# Patient Record
Sex: MALE | Race: WHITE | NOT HISPANIC OR LATINO
[De-identification: names, ages, dates, MRNs, and addresses within clinical notes are randomized per-mention and may not be internally consistent; named-entity substitution may affect disease eponyms.]

---

## 2021-04-16 ENCOUNTER — APPOINTMENT (OUTPATIENT)
Age: 56
End: 2021-04-16
Payer: COMMERCIAL

## 2021-04-16 PROCEDURE — 0001A: CPT

## 2021-05-07 ENCOUNTER — APPOINTMENT (OUTPATIENT)
Age: 56
End: 2021-05-07
Payer: COMMERCIAL

## 2021-05-07 PROCEDURE — 0002A: CPT

## 2023-03-17 ENCOUNTER — APPOINTMENT (OUTPATIENT)
Dept: CARDIOLOGY | Facility: CLINIC | Age: 58
End: 2023-03-17
Payer: COMMERCIAL

## 2023-03-17 ENCOUNTER — TRANSCRIPTION ENCOUNTER (OUTPATIENT)
Age: 58
End: 2023-03-17

## 2023-03-17 VITALS
SYSTOLIC BLOOD PRESSURE: 126 MMHG | HEIGHT: 67 IN | HEART RATE: 79 BPM | WEIGHT: 170 LBS | DIASTOLIC BLOOD PRESSURE: 70 MMHG | OXYGEN SATURATION: 98 % | BODY MASS INDEX: 26.68 KG/M2

## 2023-03-17 DIAGNOSIS — Z82.3 FAMILY HISTORY OF STROKE: ICD-10-CM

## 2023-03-17 DIAGNOSIS — Z82.49 FAMILY HISTORY OF ISCHEMIC HEART DISEASE AND OTHER DISEASES OF THE CIRCULATORY SYSTEM: ICD-10-CM

## 2023-03-17 DIAGNOSIS — F12.90 CANNABIS USE, UNSPECIFIED, UNCOMPLICATED: ICD-10-CM

## 2023-03-17 DIAGNOSIS — Z78.9 OTHER SPECIFIED HEALTH STATUS: ICD-10-CM

## 2023-03-17 DIAGNOSIS — K21.9 GASTRO-ESOPHAGEAL REFLUX DISEASE W/OUT ESOPHAGITIS: ICD-10-CM

## 2023-03-17 PROBLEM — Z00.00 ENCOUNTER FOR PREVENTIVE HEALTH EXAMINATION: Status: ACTIVE | Noted: 2023-03-17

## 2023-03-17 PROCEDURE — 99204 OFFICE O/P NEW MOD 45 MIN: CPT

## 2023-03-17 NOTE — PHYSICAL EXAM
[No Murmur] : no murmur [Normal] : moves all extremities, no focal deficits, normal speech [de-identified] : No carotid bruits auscultated bilaterally [de-identified] : extrasystolic beats auscultated

## 2023-03-17 NOTE — HISTORY OF PRESENT ILLNESS
[FreeTextEntry1] : 57 year old male with PMhx of HLD, PVCs, MVP (states he was diagnosed at least 10 years ago) presents for a cardiac evaluation prior to cholecystectomy on 3/20/2023 at [facility]. HIs preoperative ECG on 3/16/2023, revealed NSR with PVCs. Patient used to work at [email or web address] and would have yearly physicals and ECGs. About 4 years ago he was diagnosed with PVCs. He states he saw a cardiologist at that time and underwent stress testing and it was normal. He denies every wearing any monitoring device for an extended period of time. 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 No dyspnea or palpitations. Occasional he gets a flutter in his left chest. \par \par There is no history of MI, CVA, CHF, or previous coronary intervention.\par

## 2023-03-17 NOTE — DISCUSSION/SUMMARY
[FreeTextEntry1] : 1. PVCs: known for quite some time. Patient largely asymptomatic. I discussed triggers which include EtOH, caffeine. Patient should eventually undergo continuous monitoring to assess PVC burden but this can be done after his upcoming surgery. I will start Toprol XL 25mg daily, which patient can start taking now prior to his surgery and continue throughout the perioperative period. Patient will eventually undergo a stress echocardiogram and TTE, however, these do not need to be done prior to his upcoming surgery.\par \par 2. HLD: continue Zetia 10mg daily.\par \par 3. Hx of MVP: diagnosed at least 10 years ago. I explained to patient that this was over diagnosed in the past and I tend to take away the diagnosis more so than confirm it. Echocardiogram ordered as described above. Either way no need for SBE prophylaxis.\par \par The patient has much greater than 4 METs activity level without exertional complaints. There are no acute ECG changes, no murmur of aortic stenosis, and no clinical signs of heart failure. Patient is optimized from a cardiology standpoint to undergo the planned surgical procedure.\par \par Patient will follow up with me after testing.

## 2023-05-15 ENCOUNTER — APPOINTMENT (OUTPATIENT)
Dept: CARDIOLOGY | Facility: CLINIC | Age: 58
End: 2023-05-15
Payer: COMMERCIAL

## 2023-05-15 PROCEDURE — 93306 TTE W/DOPPLER COMPLETE: CPT

## 2023-05-30 ENCOUNTER — APPOINTMENT (OUTPATIENT)
Dept: CARDIOLOGY | Facility: CLINIC | Age: 58
End: 2023-05-30
Payer: COMMERCIAL

## 2023-05-30 PROCEDURE — 93244 EXT ECG>48HR<7D REV&INTERPJ: CPT

## 2023-06-06 ENCOUNTER — APPOINTMENT (OUTPATIENT)
Dept: CARDIOLOGY | Facility: CLINIC | Age: 58
End: 2023-06-06
Payer: COMMERCIAL

## 2023-06-06 PROCEDURE — 93351 STRESS TTE COMPLETE: CPT

## 2023-06-07 ENCOUNTER — APPOINTMENT (OUTPATIENT)
Dept: CARDIOLOGY | Facility: CLINIC | Age: 58
End: 2023-06-07
Payer: COMMERCIAL

## 2023-06-07 VITALS
HEIGHT: 67 IN | DIASTOLIC BLOOD PRESSURE: 72 MMHG | SYSTOLIC BLOOD PRESSURE: 112 MMHG | WEIGHT: 173 LBS | HEART RATE: 67 BPM | OXYGEN SATURATION: 97 % | BODY MASS INDEX: 27.15 KG/M2

## 2023-06-07 PROCEDURE — 99214 OFFICE O/P EST MOD 30 MIN: CPT

## 2023-06-07 NOTE — PHYSICAL EXAM
[No Murmur] : no murmur [Normal] : moves all extremities, no focal deficits, normal speech [de-identified] : No carotid bruits auscultated bilaterally [de-identified] : extrasystolic beats auscultated

## 2023-06-07 NOTE — HISTORY OF PRESENT ILLNESS
[FreeTextEntry1] : 57 year old male with PMhx of HLD, PVCs, MVP (states he was diagnosed at least 10 years ago) presents for a cardiac evaluation prior to cholecystectomy on 3/20/2023 at [facility]. HIs preoperative ECG on 3/16/2023, revealed NSR with PVCs. Patient used to work at [employer] and would have yearly physicals and ECGs. About 4 years ago he was diagnosed with PVCs. He states he saw a cardiologist at that time and underwent stress testing and it was normal. He denies every wearing any monitoring device for an extended period of time. 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 No dyspnea or palpitations. Occasional he gets a flutter in his left chest. \par \par There is no history of MI, CVA, CHF, or previous coronary intervention.\par \par Current Health Status:\par Patient with no chest pain, SOB, or palpitations. No hospitalizations since seeing me last. Remains compliant with his medications and reports no adverse effects.\par \par

## 2023-06-07 NOTE — CARDIOLOGY SUMMARY
[de-identified] : 3/16/2023, NSR with PVCs [de-identified] : 5/17/2023, 3 Day Zio: 10.5% PVC burden [de-identified] : 6/6/2023, Stress Echocardiogram: exercised for  9 minutes and 14 seconds utilizing standard Devante Protocol. No ischemic ECG changes at HR achieved. Normal LV augmentation at HR achieved (77% MPHR achieved) [de-identified] : 5/15/2023, LV EF 50-55%, prolapse of anterior MV leaflet with mild MR, moderate AI (reviewed images personally and ppears more mild), mild TR with estimated PASP of 25mmHg.

## 2023-06-07 NOTE — DISCUSSION/SUMMARY
[FreeTextEntry1] : 1. PVCs: known for quite some time. Patient largely asymptomatic. I discussed triggers which include EtOH, caffeine (patient drinks excessive amounts of coffee throughout the day). Patient had 10.5% PVC burden on Toprol XL 25mg daily. I don't strongly suspect obstructive CAD and normal LV systolic function on echocardiogram. Advised EP consultation.\par \par 2. HLD: continue Zetia 10mg daily.\par \par 3. Hx of MVP: appears stable with anterior MVP and mild MR. Periodic echo surveillance. \par \par 4. Aortic Valve Insufficiency: appears mild on echocardiogram from 5/15/2023. Periodic echo surveillance.\par \par The patient has much greater than 4 METs activity level without exertional complaints. There are no acute ECG changes, no murmur of aortic stenosis, and no clinical signs of heart failure. Patient is optimized from a cardiology standpoint to undergo the planned surgical procedure.\par \par Patient will have consultation with EP and follow up with me in 6 months.

## 2023-06-20 ENCOUNTER — APPOINTMENT (OUTPATIENT)
Dept: CARDIOLOGY | Facility: CLINIC | Age: 58
End: 2023-06-20
Payer: COMMERCIAL

## 2023-06-20 VITALS
DIASTOLIC BLOOD PRESSURE: 70 MMHG | SYSTOLIC BLOOD PRESSURE: 100 MMHG | OXYGEN SATURATION: 98 % | HEART RATE: 62 BPM | HEIGHT: 67 IN | BODY MASS INDEX: 26.68 KG/M2 | WEIGHT: 170 LBS

## 2023-06-20 PROCEDURE — 99214 OFFICE O/P EST MOD 30 MIN: CPT

## 2023-06-20 RX ORDER — OMEPRAZOLE 20 MG/1
20 CAPSULE, DELAYED RELEASE ORAL
Refills: 1 | Status: ACTIVE | COMMUNITY
Start: 2023-03-17

## 2023-06-20 NOTE — ASSESSMENT
[FreeTextEntry1] : \par 58 y/o male 57 year old male with PMhx of HLD, PVCs, MVP (states he was diagnosed at least 10 years ago), presents to EP clinic for evaluation of PVCs. PVC burden 10.5% on most recent Zio monitor. Pt states he does drink 3 cups coffee daily in the morning and will try to reduce intake. He is overall asymptomatic but will occasionally feel palpitations after his coffee. We will continue current medication regimen. If his PVC burden increases to >23% or he becomes symptomatic with change in clinical status i.e. LE swelling, fatigue, low energy, we will consider further interventions.\par \par #PVCs\par -10.5% burden on most recent Zio\par -Continue Toprol XL 25mg daily\par -No intervention at this time, can reasses if PVC burden >23% and/or worsening clinical status\par -Pt amenable to reducing AM caffeine intake. Reiterated stress, ETOH use, and caffeine intake are strongest lifestyle modification factors to reducing PVC burden.\par \par

## 2023-06-20 NOTE — CARDIOLOGY SUMMARY
[de-identified] : \par 3/16/2023, NSR with PVCs  [de-identified] : \par 5/17/2023, 3 Day Zio: 10.5% PVC burden [de-identified] : \par 6/6/2023, Stress Echocardiogram: exercised for 9 minutes and 14 seconds utilizing standard Devante Protocol. No ischemic ECG changes at HR achieved. Normal LV augmentation at HR achieved (77% MPHR achieved)  [de-identified] : \par 5/15/2023, LV EF 50-55%, prolapse of anterior MV leaflet with mild MR, moderate AI (reviewed images personally and ppears more mild), mild TR with estimated PASP of 25mmHg.

## 2023-06-20 NOTE — PHYSICAL EXAM
[No Acute Distress] : no acute distress [Normal Venous Pressure] : normal venous pressure [Normal S1, S2] : normal S1, S2 [No Murmur] : no murmur [Clear Lung Fields] : clear lung fields [No Respiratory Distress] : no respiratory distress  [Soft] : abdomen soft [Non Tender] : non-tender [Normal Gait] : normal gait [No Edema] : no edema [No Rash] : no rash [Moves all extremities] : moves all extremities [Alert and Oriented] : alert and oriented

## 2023-06-20 NOTE — HISTORY OF PRESENT ILLNESS
[FreeTextEntry1] : \par 57 year old male with PMhx of HLD, PVCs, MVP (states he was diagnosed at least 10 years ago), s/p cholecystectomy (3/20/23). Preoperative ECG revealed NSR with PVCs. Pt states he has had PVCs for 4-5 years and has had additional testing by Primary Cardiologist. 3 day Zio monitor revealed PVC burden of 10.5% despite being maintained on Toprol Xl 25mg daily.\par \par Patient does drink large amounts of coffee every morning (3+ cups daily). 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 No dyspnea or palpitations. Occasional he gets a flutter in his left chest. \par \par There is no history of MI, CVA, CHF, or previous coronary intervention.\par

## 2023-06-20 NOTE — ADDENDUM
[FreeTextEntry1] : Please note the patient was reviewed with the NP.\par I was physically present during the service of the patient\par I was directly involved in the management plan and recommendations of care provided to the patient.\par I personally reviewed the history and physical exam and plan as documented by the NP above.\par \par Dr. Oli Laguna\par 6/20/23

## 2023-12-13 ENCOUNTER — NON-APPOINTMENT (OUTPATIENT)
Age: 58
End: 2023-12-13

## 2023-12-13 ENCOUNTER — APPOINTMENT (OUTPATIENT)
Dept: CARDIOLOGY | Facility: CLINIC | Age: 58
End: 2023-12-13
Payer: COMMERCIAL

## 2023-12-13 VITALS
DIASTOLIC BLOOD PRESSURE: 72 MMHG | SYSTOLIC BLOOD PRESSURE: 138 MMHG | HEIGHT: 67 IN | OXYGEN SATURATION: 97 % | BODY MASS INDEX: 27.47 KG/M2 | WEIGHT: 175 LBS | HEART RATE: 79 BPM

## 2023-12-13 PROCEDURE — 99215 OFFICE O/P EST HI 40 MIN: CPT | Mod: 25

## 2023-12-13 PROCEDURE — 93000 ELECTROCARDIOGRAM COMPLETE: CPT

## 2023-12-13 NOTE — CARDIOLOGY SUMMARY
[de-identified] : 12/13/2023, NSR with PVCs 3/16/2023, NSR with PVCs  [de-identified] : \par  5/17/2023, 3 Day Zio: 10.5% PVC burden [de-identified] : \par  6/6/2023, Stress Echocardiogram: exercised for 9 minutes and 14 seconds utilizing standard Devante Protocol. No ischemic ECG changes at HR achieved. Normal LV augmentation at HR achieved (77% MPHR achieved)  [de-identified] : \par  5/15/2023, LV EF 50-55%, prolapse of anterior MV leaflet with mild MR, moderate AI (reviewed images personally and ppears more mild), mild TR with estimated PASP of 25mmHg.

## 2023-12-13 NOTE — DISCUSSION/SUMMARY
[FreeTextEntry1] : 1. PVCs: known for quite some time. Patient largely asymptomatic. I discussed triggers which include EtOH, caffeine (patient drinks excessive amounts of coffee throughout the day). Patient had 10.5% PVC burden on Toprol XL 25mg daily (high risk medication with no signs of toxicity) I don't strongly suspect obstructive CAD (normal stress echocardiogram, June 2023) and normal LV systolic function on echocardiogram. Saw EP with no plan for invasive management.   2. HLD: continue Zetia 10mg daily.  3. Hx of MVP: appears stable with anterior MVP and mild MR. Periodic echo surveillance.  4. Aortic Valve Insufficiency: appears mild on echocardiogram from 5/15/2023. Periodic echo surveillance.  Follow up in 6 months.   [EKG obtained to assist in diagnosis and management of assessed problem(s)] : EKG obtained to assist in diagnosis and management of assessed problem(s)

## 2023-12-13 NOTE — HISTORY OF PRESENT ILLNESS
[FreeTextEntry1] : Historical Perspective:  58 year old male with PMHx of HLD, PVCs, MVP (states he was diagnosed at least 10 years ago), s/p cholecystectomy (3/20/23). Preoperative ECG revealed NSR with PVCs. Pt states he has had PVCs for 4-5 years and has had additional testing by Primary Cardiologist. 3 day Zio monitor revealed PVC burden of 10.5% despite being maintained on Toprol XL 25mg daily.  Patient does drink large amounts of coffee every morning (3+ cups daily). 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 No dyspnea or palpitations. Occasional he gets a flutter in his left chest.   There is no history of MI, CVA, CHF, or previous coronary intervention.  Current Health Status: Patient with no chest pain, SOB, or palpitations. No hospitalizations since seeing me last. Remains compliant with his medications and reports no adverse effects. Patient had elevated GGT but normal LFTs on recent labs (12/5/2023) and pending liver US through PCP.

## 2024-06-19 ENCOUNTER — APPOINTMENT (OUTPATIENT)
Dept: CARDIOLOGY | Facility: CLINIC | Age: 59
End: 2024-06-19
Payer: COMMERCIAL

## 2024-06-19 ENCOUNTER — NON-APPOINTMENT (OUTPATIENT)
Age: 59
End: 2024-06-19

## 2024-06-19 VITALS
SYSTOLIC BLOOD PRESSURE: 128 MMHG | DIASTOLIC BLOOD PRESSURE: 70 MMHG | HEIGHT: 67 IN | BODY MASS INDEX: 26.68 KG/M2 | HEART RATE: 86 BPM | OXYGEN SATURATION: 96 % | WEIGHT: 170 LBS

## 2024-06-19 DIAGNOSIS — I34.1 NONRHEUMATIC MITRAL (VALVE) PROLAPSE: ICD-10-CM

## 2024-06-19 DIAGNOSIS — I49.3 VENTRICULAR PREMATURE DEPOLARIZATION: ICD-10-CM

## 2024-06-19 DIAGNOSIS — Z79.899 OTHER LONG TERM (CURRENT) DRUG THERAPY: ICD-10-CM

## 2024-06-19 DIAGNOSIS — I35.1 NONRHEUMATIC AORTIC (VALVE) INSUFFICIENCY: ICD-10-CM

## 2024-06-19 DIAGNOSIS — E78.5 HYPERLIPIDEMIA, UNSPECIFIED: ICD-10-CM

## 2024-06-19 PROCEDURE — 93000 ELECTROCARDIOGRAM COMPLETE: CPT

## 2024-06-19 PROCEDURE — G2211 COMPLEX E/M VISIT ADD ON: CPT | Mod: NC

## 2024-06-19 PROCEDURE — 93306 TTE W/DOPPLER COMPLETE: CPT

## 2024-06-19 PROCEDURE — 99215 OFFICE O/P EST HI 40 MIN: CPT

## 2024-06-19 RX ORDER — TADALAFIL 5 MG/1
5 TABLET ORAL
Refills: 0 | Status: ACTIVE | COMMUNITY

## 2024-06-19 RX ORDER — EZETIMIBE 10 MG/1
10 TABLET ORAL
Qty: 90 | Refills: 3 | Status: ACTIVE | COMMUNITY
Start: 2023-03-17 | End: 1900-01-01

## 2024-06-19 RX ORDER — METOPROLOL SUCCINATE 25 MG/1
25 TABLET, EXTENDED RELEASE ORAL
Qty: 90 | Refills: 3 | Status: ACTIVE | COMMUNITY
Start: 2023-03-17 | End: 1900-01-01

## 2024-06-19 NOTE — HISTORY OF PRESENT ILLNESS
[FreeTextEntry1] : Historical Perspective: 58 year old male with PMHx of HLD, PVCs, MVP (states he was diagnosed at least 10 years ago), s/p cholecystectomy (3/20/23). Preoperative ECG revealed NSR with PVCs. Pt states he has had PVCs for 4-5 years and has had additional testing by Primary Cardiologist. 3 day Zio monitor revealed PVC burden of 10.5% despite being maintained on Toprol XL 25mg daily.  Patient does drink large amounts of coffee every morning (3+ cups daily). 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 No dyspnea or palpitations. Occasional he gets a flutter in his left chest.   There is no history of MI, CVA, CHF, or previous coronary intervention.  Current Health Status: Patient with no chest pain, SOB, or palpitations. No hospitalizations since seeing me last. Remains compliant with medications and reports no adverse effects.

## 2024-06-19 NOTE — DISCUSSION/SUMMARY
[FreeTextEntry1] : 1. PVCs: known for quite some time. Patient largely asymptomatic. I discussed triggers which include EtOH, caffeine (patient drinks excessive amounts of coffee throughout the day). Patient had 10.5% PVC burden on Toprol XL 25mg daily (high risk medication with no signs of toxicity) I don't strongly suspect obstructive CAD (normal stress echocardiogram, June 2023) and normal LV systolic function on echocardiogram. Saw EP with no plan for invasive management.   2. HLD: continue Zetia 10mg daily.  3. Hx of MVP: appears stable with anterior MVP and mild MR. Periodic echo surveillance.  4. Aortic Valve Insufficiency: mild-moderate and stable on echocardiogram from 6/19/2024. Periodic echo surveillance.  Follow up in 6 months.   [EKG obtained to assist in diagnosis and management of assessed problem(s)] : EKG obtained to assist in diagnosis and management of assessed problem(s)

## 2024-06-19 NOTE — CARDIOLOGY SUMMARY
[de-identified] : 6/19/2024, NSR with single PVC 12/13/2023, NSR with PVCs 3/16/2023, NSR with PVCs  [de-identified] : \par  5/17/2023, 3 Day Zio: 10.5% PVC burden [de-identified] : \par  6/6/2023, Stress Echocardiogram: exercised for 9 minutes and 14 seconds utilizing standard Devante Protocol. No ischemic ECG changes at HR achieved. Normal LV augmentation at HR achieved (77% MPHR achieved)  [de-identified] : 6/19/2024, LV EF 55%, prolapse anterior MV leaflet with trace MR, moderate AI, mild TR with estimated PASP of 32mmHg. 5/15/2023, LV EF 50-55%, prolapse of anterior MV leaflet with mild MR, moderate AI (reviewed images personally and appears more mild), mild TR with estimated PASP of 25mmHg.

## 2024-12-18 ENCOUNTER — APPOINTMENT (OUTPATIENT)
Dept: CARDIOLOGY | Facility: CLINIC | Age: 59
End: 2024-12-18
Payer: COMMERCIAL

## 2024-12-18 ENCOUNTER — NON-APPOINTMENT (OUTPATIENT)
Age: 59
End: 2024-12-18

## 2024-12-18 VITALS
SYSTOLIC BLOOD PRESSURE: 116 MMHG | BODY MASS INDEX: 28.25 KG/M2 | HEIGHT: 67 IN | WEIGHT: 180 LBS | DIASTOLIC BLOOD PRESSURE: 60 MMHG | OXYGEN SATURATION: 97 % | HEART RATE: 79 BPM

## 2024-12-18 DIAGNOSIS — I49.3 VENTRICULAR PREMATURE DEPOLARIZATION: ICD-10-CM

## 2024-12-18 DIAGNOSIS — I34.1 NONRHEUMATIC MITRAL (VALVE) PROLAPSE: ICD-10-CM

## 2024-12-18 DIAGNOSIS — I35.1 NONRHEUMATIC AORTIC (VALVE) INSUFFICIENCY: ICD-10-CM

## 2024-12-18 DIAGNOSIS — E78.5 HYPERLIPIDEMIA, UNSPECIFIED: ICD-10-CM

## 2024-12-18 PROCEDURE — 93000 ELECTROCARDIOGRAM COMPLETE: CPT

## 2024-12-18 PROCEDURE — G2211 COMPLEX E/M VISIT ADD ON: CPT | Mod: NC

## 2024-12-18 PROCEDURE — 99214 OFFICE O/P EST MOD 30 MIN: CPT

## 2025-06-04 ENCOUNTER — APPOINTMENT (OUTPATIENT)
Dept: CARDIOLOGY | Facility: CLINIC | Age: 60
End: 2025-06-04
Payer: COMMERCIAL

## 2025-06-04 VITALS
HEIGHT: 67 IN | HEART RATE: 74 BPM | DIASTOLIC BLOOD PRESSURE: 64 MMHG | OXYGEN SATURATION: 95 % | BODY MASS INDEX: 28.25 KG/M2 | SYSTOLIC BLOOD PRESSURE: 122 MMHG | WEIGHT: 180 LBS

## 2025-06-04 DIAGNOSIS — I34.1 NONRHEUMATIC MITRAL (VALVE) PROLAPSE: ICD-10-CM

## 2025-06-04 DIAGNOSIS — I35.1 NONRHEUMATIC AORTIC (VALVE) INSUFFICIENCY: ICD-10-CM

## 2025-06-04 DIAGNOSIS — I49.3 VENTRICULAR PREMATURE DEPOLARIZATION: ICD-10-CM

## 2025-06-04 DIAGNOSIS — E78.5 HYPERLIPIDEMIA, UNSPECIFIED: ICD-10-CM

## 2025-06-04 PROCEDURE — 99214 OFFICE O/P EST MOD 30 MIN: CPT

## 2025-06-04 PROCEDURE — 93306 TTE W/DOPPLER COMPLETE: CPT

## 2025-06-04 PROCEDURE — G2211 COMPLEX E/M VISIT ADD ON: CPT | Mod: NC

## 2025-08-02 ENCOUNTER — NON-APPOINTMENT (OUTPATIENT)
Age: 60
End: 2025-08-02

## 2025-09-10 ENCOUNTER — TRANSCRIPTION ENCOUNTER (OUTPATIENT)
Age: 60
End: 2025-09-10